# Patient Record
Sex: FEMALE | Race: BLACK OR AFRICAN AMERICAN | Employment: UNEMPLOYED | URBAN - METROPOLITAN AREA
[De-identification: names, ages, dates, MRNs, and addresses within clinical notes are randomized per-mention and may not be internally consistent; named-entity substitution may affect disease eponyms.]

---

## 2018-08-31 ENCOUNTER — OFFICE VISIT (OUTPATIENT)
Dept: FAMILY MEDICINE CLINIC | Age: 43
End: 2018-08-31

## 2018-08-31 VITALS
OXYGEN SATURATION: 95 % | BODY MASS INDEX: 20.09 KG/M2 | WEIGHT: 125 LBS | HEIGHT: 66 IN | HEART RATE: 97 BPM | RESPIRATION RATE: 18 BRPM | DIASTOLIC BLOOD PRESSURE: 82 MMHG | TEMPERATURE: 98.4 F | SYSTOLIC BLOOD PRESSURE: 130 MMHG

## 2018-08-31 DIAGNOSIS — Z72.0 TOBACCO USE: ICD-10-CM

## 2018-08-31 DIAGNOSIS — R63.4 WEIGHT LOSS: Primary | ICD-10-CM

## 2018-08-31 DIAGNOSIS — R39.15 URINARY URGENCY: ICD-10-CM

## 2018-08-31 DIAGNOSIS — Z00.00 ROUTINE ADULT HEALTH MAINTENANCE: ICD-10-CM

## 2018-08-31 DIAGNOSIS — F11.91 HISTORY OF HEROIN USE: ICD-10-CM

## 2018-08-31 DIAGNOSIS — J45.40 MODERATE PERSISTENT ASTHMATIC BRONCHITIS WITHOUT COMPLICATION: ICD-10-CM

## 2018-08-31 RX ORDER — CHLORPHENIRAMINE MALEATE 4 MG
TABLET ORAL
Refills: 1 | COMMUNITY
Start: 2018-07-11

## 2018-08-31 RX ORDER — FLUCONAZOLE 200 MG/1
TABLET ORAL
Refills: 0 | COMMUNITY
Start: 2018-08-28

## 2018-08-31 RX ORDER — NYSTATIN 100000 [USP'U]/ML
SUSPENSION ORAL
Refills: 1 | COMMUNITY
Start: 2018-07-11 | End: 2018-08-31

## 2018-08-31 RX ORDER — BUDESONIDE AND FORMOTEROL FUMARATE DIHYDRATE 160; 4.5 UG/1; UG/1
AEROSOL RESPIRATORY (INHALATION)
Refills: 2 | COMMUNITY
Start: 2018-07-11

## 2018-08-31 RX ORDER — OMEPRAZOLE 20 MG/1
CAPSULE, DELAYED RELEASE ORAL
Refills: 2 | COMMUNITY
Start: 2018-08-28

## 2018-08-31 NOTE — PROGRESS NOTES
Chief Complaint   Patient presents with    Complete Physical     New Patient .  has concern of weight loss and appetite     Cough     cough with some mucus production , more mucus production in the early morning and then dry throughout the day. has seen an ENT and Pulmonologist

## 2018-08-31 NOTE — PATIENT INSTRUCTIONS
Stopping Smoking: Care Instructions  Your Care Instructions  Cigarette smokers crave the nicotine in cigarettes. Giving it up is much harder than simply changing a habit. Your body has to stop craving the nicotine. It is hard to quit, but you can do it. There are many tools that people use to quit smoking. You may find that combining tools works best for you. There are several steps to quitting. First you get ready to quit. Then you get support to help you. After that, you learn new skills and behaviors to become a nonsmoker. For many people, a necessary step is getting and using medicine. Your doctor will help you set up the plan that best meets your needs. You may want to attend a smoking cessation program to help you quit smoking. When you choose a program, look for one that has proven success. Ask your doctor for ideas. You will greatly increase your chances of success if you take medicine as well as get counseling or join a cessation program.  Some of the changes you feel when you first quit tobacco are uncomfortable. Your body will miss the nicotine at first, and you may feel short-tempered and grumpy. You may have trouble sleeping or concentrating. Medicine can help you deal with these symptoms. You may struggle with changing your smoking habits and rituals. The last step is the tricky one: Be prepared for the smoking urge to continue for a time. This is a lot to deal with, but keep at it. You will feel better. Follow-up care is a key part of your treatment and safety. Be sure to make and go to all appointments, and call your doctor if you are having problems. It's also a good idea to know your test results and keep a list of the medicines you take. How can you care for yourself at home? · Ask your family, friends, and coworkers for support. You have a better chance of quitting if you have help and support.   · Join a support group, such as Nicotine Anonymous, for people who are trying to quit smoking. · Consider signing up for a smoking cessation program, such as the American Lung Association's Freedom from Smoking program.  · Get text messaging support. Go to the website at www.smokefree. gov to sign up for the [facility] program.  · Set a quit date. Pick your date carefully so that it is not right in the middle of a big deadline or stressful time. Once you quit, do not even take a puff. Get rid of all ashtrays and lighters after your last cigarette. Clean your house and your clothes so that they do not smell of smoke. · Learn how to be a nonsmoker. Think about ways you can avoid those things that make you reach for a cigarette. ¨ Avoid situations that put you at greatest risk for smoking. For some people, it is hard to have a drink with friends without smoking. For others, they might skip a coffee break with coworkers who smoke. ¨ Change your daily routine. Take a different route to work or eat a meal in a different place. · Cut down on stress. Calm yourself or release tension by doing an activity you enjoy, such as reading a book, taking a hot bath, or gardening. · Talk to your doctor or pharmacist about nicotine replacement therapy, which replaces the nicotine in your body. You still get nicotine but you do not use tobacco. Nicotine replacement products help you slowly reduce the amount of nicotine you need. These products come in several forms, many of them available over-the-counter:  ¨ Nicotine patches  ¨ Nicotine gum and lozenges  ¨ Nicotine inhaler  · Ask your doctor about bupropion (Wellbutrin) or varenicline (Chantix), which are prescription medicines. They do not contain nicotine. They help you by reducing withdrawal symptoms, such as stress and anxiety. · Some people find hypnosis, acupuncture, and massage helpful for ending the smoking habit. · Eat a healthy diet and get regular exercise. Having healthy habits will help your body move past its craving for nicotine.   · Be prepared to keep trying. Most people are not successful the first few times they try to quit. Do not get mad at yourself if you smoke again. Make a list of things you learned and think about when you want to try again, such as next week, next month, or next year. Where can you learn more? Go to http://keyla-noel.info/. Enter N245 in the search box to learn more about \"Stopping Smoking: Care Instructions. \"  Current as of: November 29, 2017  Content Version: 11.7  © 0791-5578 [employer]. Care instructions adapted under license by [employer] (which disclaims liability or warranty for this information). If you have questions about a medical condition or this instruction, always ask your healthcare professional. [address] any warranty or liability for your use of this information. Abnormal Weight Loss: Care Instructions  Your Care Instructions    There are two types of weight loss-normal and abnormal. The normal kind happens when you are trying to lose weight by exercising more or eating less. The abnormal kind happens when you are not trying to lose weight. Many medical problems can cause abnormal weight loss. These include problems with your thyroid gland, long-term infections, mouth or throat problems that make it hard to eat, and digestive problems. They also include depression and cancer. Some medicines also may cause you to lose weight. You can work with your doctor to find the cause of your weight loss. You will probably need tests to do this. Follow-up care is a key part of your treatment and safety. Be sure to make and go to all appointments, and call your doctor if you are having problems. It's also a good idea to know your test results and keep a list of the medicines you take. How can you care for yourself at home? · Weigh yourself at the same time every day. It's best to do it first thing in the morning after you empty your bladder. Be sure to always wear the same amount of clothing. · Write down any changes in your weight and the possible causes. Discuss these with your doctor. · Your doctor may want you to change your diet. Do your best to follow his or her advice. · Ask your doctor if you should see a dietitian. This is a person who can help you plan meals that work best for your lifestyle. · Note any changes in bowel habits. These may include changes in how often you have a bowel movement. Other changes include the color and size of your stools and how solid they are. · If you are prescribed medicines, take them exactly as prescribed. Call your doctor if you think you are having a problem with your medicine. You will get more details on the specific medicines your doctor prescribes. When should you call for help? Watch closely for changes in your health, and be sure to contact your doctor if:    · You do not get better as expected.     · You continue to lose weight. Where can you learn more? Go to http://keyla-noel.info/. Enter W012 in the search box to learn more about \"Abnormal Weight Loss: Care Instructions. \"  Current as of: October 9, 2017  Content Version: 11.7  © 3223-6583 [employer]. Care instructions adapted under license by [employer] (which disclaims liability or warranty for this information). If you have questions about a medical condition or this instruction, always ask your healthcare professional. [address] any warranty or liability for your use of this information.

## 2018-08-31 NOTE — MR AVS SNAPSHOT
[address] 
[phone] Patient: Wilfred Raygoza MRN: [id number] :[id number] Visit Information Date & Time Provider Department Dept. Phone Encounter #  
 2018  9:30 AM Daniela Olivares  [facility] [phone] [id number] Follow-up Instructions Return in about 2 weeks (around 2018) for pap smear. Upcoming Health Maintenance Date Due Pneumococcal 19-64 Medium Risk (1 of 1 - PPSV23) 1993 DTaP/Tdap/Td series (1 - Tdap) 1995 PAP AKA CERVICAL CYTOLOGY 1995 Influenza Age 5 to Adult 3/31/2019* *Topic was postponed. The date shown is not the original due date. Allergies as of 2018  Review Complete On: 2018 By: Janet Peres LPN No Known Allergies Current Immunizations  Never Reviewed No immunizations on file. Not reviewed this visit You Were Diagnosed With   
  
 Codes Comments Weight loss    -  Primary ICD-10-CM: R63.4 ICD-9-CM: 783.21 Moderate persistent asthmatic bronchitis without complication     OSZ-01-BA: J45.40 ICD-9-CM: 493.90 Routine adult health maintenance     ICD-10-CM: Z00.00 ICD-9-CM: V70.0 Encounter for screening for lipid disorder     ICD-10-CM: Z13.220 ICD-9-CM: V77.91 History of heroin use     ICD-10-CM: Z86.59 
ICD-9-CM: V11.8 Tobacco use     ICD-10-CM: Z72.0 ICD-9-CM: 305.1 Urinary urgency     ICD-10-CM: R39.15 ICD-9-CM: 418.13 Vitals BP Pulse Temp Resp Height(growth percentile) Weight(growth percentile) 130/82 (BP 1 Location: Left arm, BP Patient Position: Sitting) 97 98.4 °F (36.9 °C) (Oral) 18 5' 6\" (1.676 m) 125 lb (56.7 kg) LMP SpO2 BMI OB Status Smoking Status 2018 95% 20.18 kg/m2 Having regular periods Current Every Day Smoker Vitals History BMI and BSA Data Body Mass Index Body Surface Area 20.18 kg/m 2 1.62 m 2 Your Updated Medication List  
  
   
This list is accurate as of 8/31/18 10:07 AM.  Always use your most recent med list.  
  
  
  
  
 clotrimazole 1 % topical cream  
Commonly known as:  LOTRIMIN  
APPLY TO AFFECTED AREA TWICE A DAY  
  
 fluconazole 200 mg tablet Commonly known as:  DIFLUCAN  
TAKE 2 TABLETS BY MOUTH EVERY DAY  
  
 omeprazole 20 mg capsule Commonly known as:  PRILOSEC  
TAKE ONE CAPSULE BY MOUTH TWICE A DAY 30 MINUTES PRIOR TO A MEAL  
  
 SYMBICORT 160-4.5 mcg/actuation [facility] Generic drug:  budesonide-formoterol INHALE 2 PUFFS BY MOUTH TWICE A DAY We Performed the Following CBC WITH AUTOMATED DIFF [81704 CPT(R)] CHRONIC HEPATITIS PANEL [[id number] Custom] HIV 1/2 AG/AB, 4TH GENERATION,W RFLX CONFIRM [id number] CPT(R)] METABOLIC PANEL, COMPREHENSIVE [52406 CPT(R)] OCCULT BLOOD IMMUNOASSAY,DIAGNOSTIC [44871 CPT(R)] TSH REFLEX TO T4 [53756 CPT(R)] UA/M W/RFLX CULTURE, ROUTINE [[id number] Custom] Follow-up Instructions Return in about 2 weeks (around 9/14/2018) for pap smear. Patient Instructions Stopping Smoking: Care Instructions Your Care Instructions Cigarette smokers crave the nicotine in cigarettes. Giving it up is much harder than simply changing a habit. Your body has to stop craving the nicotine. It is hard to quit, but you can do it. There are many tools that people use to quit smoking. You may find that combining tools works best for you. There are several steps to quitting. First you get ready to quit. Then you get support to help you. After that, you learn new skills and behaviors to become a nonsmoker. For many people, a necessary step is getting and using medicine. Your doctor will help you set up the plan that best meets your needs. You may want to attend a smoking cessation program to help you quit smoking. When you choose a program, look for one that has proven success.  Ask your doctor for ideas. You will greatly increase your chances of success if you take medicine as well as get counseling or join a cessation program. 
Some of the changes you feel when you first quit tobacco are uncomfortable. Your body will miss the nicotine at first, and you may feel short-tempered and grumpy. You may have trouble sleeping or concentrating. Medicine can help you deal with these symptoms. You may struggle with changing your smoking habits and rituals. The last step is the tricky one: Be prepared for the smoking urge to continue for a time. This is a lot to deal with, but keep at it. You will feel better. Follow-up care is a key part of your treatment and safety. Be sure to make and go to all appointments, and call your doctor if you are having problems. It's also a good idea to know your test results and keep a list of the medicines you take. How can you care for yourself at home? · Ask your family, friends, and coworkers for support. You have a better chance of quitting if you have help and support. · Join a support group, such as Nicotine Anonymous, for people who are trying to quit smoking. · Consider signing up for a smoking cessation program, such as the American Lung Association's Freedom from Smoking program. 
· Get text messaging support. Go to the website at www.smokefree. gov to sign up for the [facility] program. 
· Set a quit date. Pick your date carefully so that it is not right in the middle of a big deadline or stressful time. Once you quit, do not even take a puff. Get rid of all ashtrays and lighters after your last cigarette. Clean your house and your clothes so that they do not smell of smoke. · Learn how to be a nonsmoker. Think about ways you can avoid those things that make you reach for a cigarette. ¨ Avoid situations that put you at greatest risk for smoking. For some people, it is hard to have a drink with friends without smoking.  For others, they might skip a coffee break with coworkers who smoke. ¨ Change your daily routine. Take a different route to work or eat a meal in a different place. · Cut down on stress. Calm yourself or release tension by doing an activity you enjoy, such as reading a book, taking a hot bath, or gardening. · Talk to your doctor or pharmacist about nicotine replacement therapy, which replaces the nicotine in your body. You still get nicotine but you do not use tobacco. Nicotine replacement products help you slowly reduce the amount of nicotine you need. These products come in several forms, many of them available over-the-counter: ¨ Nicotine patches ¨ Nicotine gum and lozenges ¨ Nicotine inhaler · Ask your doctor about bupropion (Wellbutrin) or varenicline (Chantix), which are prescription medicines. They do not contain nicotine. They help you by reducing withdrawal symptoms, such as stress and anxiety. · Some people find hypnosis, acupuncture, and massage helpful for ending the smoking habit. · Eat a healthy diet and get regular exercise. Having healthy habits will help your body move past its craving for nicotine. · Be prepared to keep trying. Most people are not successful the first few times they try to quit. Do not get mad at yourself if you smoke again. Make a list of things you learned and think about when you want to try again, such as next week, next month, or next year. Where can you learn more? Go to http://keyla-noel.info/. Enter Q409 in the search box to learn more about \"Stopping Smoking: Care Instructions. \" Current as of: November 29, 2017 Content Version: 11.7 © 0505-0142 [employer]. Care instructions adapted under license by Optiant (which disclaims liability or warranty for this information).  If you have questions about a medical condition or this instruction, always ask your healthcare professional. [employer] disclaims any warranty or liability for your use of this information. Abnormal Weight Loss: Care Instructions Your Care Instructions There are two types of weight loss-normal and abnormal. The normal kind happens when you are trying to lose weight by exercising more or eating less. The abnormal kind happens when you are not trying to lose weight. Many medical problems can cause abnormal weight loss. These include problems with your thyroid gland, long-term infections, mouth or throat problems that make it hard to eat, and digestive problems. They also include depression and cancer. Some medicines also may cause you to lose weight. You can work with your doctor to find the cause of your weight loss. You will probably need tests to do this. Follow-up care is a key part of your treatment and safety. Be sure to make and go to all appointments, and call your doctor if you are having problems. It's also a good idea to know your test results and keep a list of the medicines you take. How can you care for yourself at home? · Weigh yourself at the same time every day. It's best to do it first thing in the morning after you empty your bladder. Be sure to always wear the same amount of clothing. · Write down any changes in your weight and the possible causes. Discuss these with your doctor. · Your doctor may want you to change your diet. Do your best to follow his or her advice. · Ask your doctor if you should see a dietitian. This is a person who can help you plan meals that work best for your lifestyle. · Note any changes in bowel habits. These may include changes in how often you have a bowel movement. Other changes include the color and size of your stools and how solid they are. · If you are prescribed medicines, take them exactly as prescribed. Call your doctor if you think you are having a problem with your medicine.  You will get more details on the specific medicines your doctor prescribes. When should you call for help? Watch closely for changes in your health, and be sure to contact your doctor if: 
  · You do not get better as expected.  
  · You continue to lose weight. Where can you learn more? Go to http://keyla-noel.info/. Enter A790 in the search box to learn more about \"Abnormal Weight Loss: Care Instructions. \" Current as of: October 9, 2017 Content Version: 11.7 © 8139-9747 [employer]ently. Care instructions adapted under license by Vaultive (which disclaims liability or warranty for this information). If you have questions about a medical condition or this instruction, always ask your healthcare professional. [address] any warranty or liability for your use of this information. Introducing [facility]! [employer] introduces [employer] patient portal. Now you can access parts of your medical record, email your doctor's office, and request medication refills online. 1. In your internet browser, go to [email or web address] 2. Click on the First Time User? Click Here link in the Sign In box. You will see the New Member Sign Up page. 3. Enter your [employer] Access Code exactly as it appears below. You will not need to use this code after youve completed the sign-up process. If you do not sign up before the expiration date, you must request a new code. · [employer] Access Code: [email or web address] Expires: 11/29/2018 10:07 AM 
 
4. Enter the last four digits of your Social Security Number (xxxx) and Date of Birth (mm/dd/yyyy) as indicated and click Submit. You will be taken to the next sign-up page. 5. Create a [employer] ID. This will be your [employer] login ID and cannot be changed, so think of one that is secure and easy to remember. 6. Create a [employer] password. You can change your password at any time. 7. Enter your Password Reset Question and Answer. This can be used at a later time if you forget your password. 8. Enter your e-mail address. You will receive e-mail notification when new information is available in [address] 9. Click Sign Up. You can now view and download portions of your medical record. 10. Click the Download Summary menu link to download a portable copy of your medical information. If you have questions, please visit the Frequently Asked Questions section of the In[employer] website. Remember, [employer] is NOT to be used for urgent needs. For medical emergencies, dial 911. Now available from your iPhone and Android! Please provide this summary of care documentation to your next provider. Your primary care clinician is listed as PROVIDER UNKNOWN. If you have any questions after today's visit, please call [phone].

## 2018-09-04 LAB
ALBUMIN SERPL-MCNC: 3.5 G/DL (ref 3.5–5.5)
ALBUMIN/GLOB SERPL: 0.9 {RATIO} (ref 1.2–2.2)
ALP SERPL-CCNC: 76 IU/L (ref 39–117)
ALT SERPL-CCNC: 7 IU/L (ref 0–32)
APPEARANCE UR: CLEAR
AST SERPL-CCNC: 18 IU/L (ref 0–40)
BACTERIA #/AREA URNS HPF: NORMAL /[HPF]
BACTERIA UR CULT: ABNORMAL
BASOPHILS # BLD AUTO: 0 X10E3/UL (ref 0–0.2)
BASOPHILS NFR BLD AUTO: 0 %
BILIRUB SERPL-MCNC: <0.2 MG/DL (ref 0–1.2)
BILIRUB UR QL STRIP: NEGATIVE
BUN SERPL-MCNC: 7 MG/DL (ref 6–24)
BUN/CREAT SERPL: 8 (ref 9–23)
CALCIUM SERPL-MCNC: 9.1 MG/DL (ref 8.7–10.2)
CASTS URNS QL MICRO: NORMAL /LPF
CHLORIDE SERPL-SCNC: 102 MMOL/L (ref 96–106)
CO2 SERPL-SCNC: 24 MMOL/L (ref 20–29)
COLOR UR: YELLOW
COMMENT, 144067: NORMAL
CREAT SERPL-MCNC: 0.9 MG/DL (ref 0.57–1)
EOSINOPHIL # BLD AUTO: 0.1 X10E3/UL (ref 0–0.4)
EOSINOPHIL NFR BLD AUTO: 2 %
EPI CELLS #/AREA URNS HPF: NORMAL /HPF
ERYTHROCYTE [DISTWIDTH] IN BLOOD BY AUTOMATED COUNT: 15.1 % (ref 12.3–15.4)
GLOBULIN SER CALC-MCNC: 3.8 G/DL (ref 1.5–4.5)
GLUCOSE SERPL-MCNC: 82 MG/DL (ref 65–99)
GLUCOSE UR QL: NEGATIVE
HBV CORE AB SERPL QL IA: NEGATIVE
HBV CORE IGM SERPL QL IA: NEGATIVE
HBV E AB SERPL QL IA: NEGATIVE
HBV E AG SERPL QL IA: NEGATIVE
HBV SURFACE AB SER QL: REACTIVE
HBV SURFACE AG SERPL QL IA: NEGATIVE
HCT VFR BLD AUTO: 29.9 % (ref 34–46.6)
HCV AB S/CO SERPL IA: <0.1 S/CO RATIO (ref 0–0.9)
HGB BLD-MCNC: 9.7 G/DL (ref 11.1–15.9)
HGB UR QL STRIP: NEGATIVE
HIV 1+2 AB+HIV1 P24 AG SERPL QL IA: REACTIVE
HIV 2 AB SERPL QL IA: NEGATIVE
HIV1 AB SERPLBLD QL IA.RAPID: POSITIVE
IMM GRANULOCYTES # BLD: 0 X10E3/UL (ref 0–0.1)
IMM GRANULOCYTES NFR BLD: 1 %
INTERPRETATION: ABNORMAL
KETONES UR QL STRIP: NEGATIVE
LEUKOCYTE ESTERASE UR QL STRIP: ABNORMAL
LYMPHOCYTES # BLD AUTO: 0.8 X10E3/UL (ref 0.7–3.1)
LYMPHOCYTES NFR BLD AUTO: 16 %
MCH RBC QN AUTO: 29.4 PG (ref 26.6–33)
MCHC RBC AUTO-ENTMCNC: 32.4 G/DL (ref 31.5–35.7)
MCV RBC AUTO: 91 FL (ref 79–97)
MICRO URNS: ABNORMAL
MONOCYTES # BLD AUTO: 0.2 X10E3/UL (ref 0.1–0.9)
MONOCYTES NFR BLD AUTO: 5 %
MUCOUS THREADS URNS QL MICRO: PRESENT
NEUTROPHILS # BLD AUTO: 3.5 X10E3/UL (ref 1.4–7)
NEUTROPHILS NFR BLD AUTO: 76 %
NITRITE UR QL STRIP: POSITIVE
PH UR STRIP: 6 [PH] (ref 5–7.5)
PLATELET # BLD AUTO: 340 X10E3/UL (ref 150–379)
POTASSIUM SERPL-SCNC: 3.6 MMOL/L (ref 3.5–5.2)
PROT SERPL-MCNC: 7.3 G/DL (ref 6–8.5)
PROT UR QL STRIP: ABNORMAL
RBC # BLD AUTO: 3.3 X10E6/UL (ref 3.77–5.28)
RBC #/AREA URNS HPF: NORMAL /HPF
SODIUM SERPL-SCNC: 141 MMOL/L (ref 134–144)
SP GR UR: 1.01 (ref 1–1.03)
TSH SERPL DL<=0.005 MIU/L-ACNC: 2.96 UIU/ML (ref 0.45–4.5)
URINALYSIS REFLEX, 377202: ABNORMAL
UROBILINOGEN UR STRIP-MCNC: 1 MG/DL (ref 0.2–1)
WBC # BLD AUTO: 4.6 X10E3/UL (ref 3.4–10.8)
WBC #/AREA URNS HPF: NORMAL /HPF

## 2018-09-05 ENCOUNTER — TELEPHONE (OUTPATIENT)
Dept: FAMILY MEDICINE CLINIC | Age: 43
End: 2018-09-05

## 2018-09-05 DIAGNOSIS — N30.00 ACUTE CYSTITIS WITHOUT HEMATURIA: Primary | ICD-10-CM

## 2018-09-05 RX ORDER — NITROFURANTOIN 25; 75 MG/1; MG/1
100 CAPSULE ORAL 2 TIMES DAILY
Qty: 10 CAP | Refills: 0 | Status: CANCELLED | OUTPATIENT
Start: 2018-09-05 | End: 2018-09-10

## 2018-09-05 NOTE — TELEPHONE ENCOUNTER
Called patient to discussed lab results. She did not answer and a voicemail message was left to return call.

## 2018-09-06 NOTE — TELEPHONE ENCOUNTER
Attempted to call patient to discuss lab results. Voicemail was received and message was left to return phone call.

## 2018-09-07 NOTE — PROGRESS NOTES
Please call patient. Have her schedule appointment ASAP to review her lab results. Please confirm pharmacy so that we can send prescription for UTI that is pending. Thank you.

## 2018-09-07 NOTE — PROGRESS NOTES
[phone] attempted to call patient no answer left message to call me back in regards to her lab results

## 2018-09-10 NOTE — PROGRESS NOTES
I have been unable to contact the patient multiple times by her provided phone number at [phone]. Franklyn Quevedo, please print off a letter with all results and advise to contact our office ASAP for treatment of UTI and to discuss referral to Infectious Disease specialist for treatment of HIV - Will we have to send a certified letter. Thank you.

## 2018-09-10 NOTE — TELEPHONE ENCOUNTER
Attempted to call patient multiple times, each time I have received voicemail. Message left to call back to discuss results. Will proceed with certified letter at this time.

## 2018-09-11 NOTE — TELEPHONE ENCOUNTER
Certified letter with results of recent lab work, including positive HIV status was mailed to patient today 9/11/2018, after multiple attempts to call the patient without response. HIV status was reported to Health department per guidelines, see scanned report)

## 2018-09-11 NOTE — PROGRESS NOTES
[phone] attempted to call patient no answer left message again that its important for her to call us back in regards to her lab results  Per Kimmy Brantley we are going to send certified letter for patients labs

## 2018-09-13 ENCOUNTER — TELEPHONE (OUTPATIENT)
Dept: FAMILY MEDICINE CLINIC | Age: 43
End: 2018-09-13

## 2018-09-13 NOTE — TELEPHONE ENCOUNTER
Rocio transfer have Ed from [facility] Department calling needing to speak to you about patients Juan Pabloluzma Forde [birth year] lab results    Spoke to [facility] Department per Ed they have received lab results patient in regards to HIV being positive he wanted to find out if patient is aware of her results. I advised Ed we have tried to contact patient multiple times, patient had follow up today and no show, I did advised him that we did send certified letter.  Per Ed he cant talk to patient till she is aware of the results but he will call me back again next week to see if we were able to contact patient if we can't then he will be able to send someone to her residence to discuss the situation

## 2018-10-02 NOTE — PROGRESS NOTES
Unable to reach patient. Certified letter sent regarding the need for treatment for UTI and new diagnosis of HIV. Will close encounter at this time.

## 2019-07-24 NOTE — PROGRESS NOTES
[facility] Note  HPI:   Natalie Suggs is a 36 y.o. female who presents to [facility] care with the following chief complaints. Previous provider: None    Chief Complaint   Patient presents with    Complete Physical     New Patient .  has concern of weight loss and appetite     Cough     cough with some mucus production , more mucus production in the early morning and then dry throughout the day. has seen an ENT and Pulmonologist      Chronic Cough  Developed cough 1/2018. At first did not think much of it. Cough persisted and she was evaluated by an ENT in 2/2018. Given nasal steroid spray without relief and was given nystatin mouth wash for thrush. They discussed allergy testing but she did not have then this done. The cough worsened so she scheduled herself to see a pulmonology. In the interim she presented to [facility] with normal chest x-ray. Was finally evaluated by pulmonologist, Dr. Palomo Dotson, in 7/2018. He diagnosed her with asthmatic bronchitis. She states chest ct showed pustules at end of bronchioles which he thought would improve. She was started on daily Symbicort and omeprazole for reflux symptoms, and was given oral diflucan for thrush that had not improved. He ordered blood test to be drawn but she has not had them drawn yet. She was waiting to be seen here today in case labs could be drawn at the same time. Weight loss  She has had 30 pound weight loss since 2/2018; Previously around 155 lbs, 125 lbs today. She had a decreased appetite. Appetite has improved greatly since starting PPI last week. She feels her weight has been stable over the past few weeks without additional weight loss. She drinks Ensures about 1 a day for nutritional support. History of heroin use from 2005- 2007. Denies current drug use. Current everyday smoker since age 11. Has cut back from 1 pack to .25 pack per day with cough. She is thinking about cutting back further.  Not 1       Allergies: Allergies   Allergen Reactions    Amoxicillin     Clindamycin/Lincomycin     Focalin [Dexmethylphenidate Hcl]      headaches    Pertussis Vaccines        Problem List:    Patient Active Problem List   Diagnosis Code    Decreased hearing H91.90    Interstitial cystitis N30.10    Attention deficit hyperactivity disorder (ADHD), predominantly inattentive type F90.0    Otitic barotrauma T70. 0XXA    Right otitis media with effusion H65.91    Sensorineural hearing loss (SNHL) of left ear with unrestricted hearing of right ear H90.42    Tinnitus of both ears H93.13    Ear pressure, bilateral H93.8X3       Past Medical History:        Diagnosis Date    Attention deficit hyperactivity disorder (ADHD), predominantly inattentive type     as a teenager    Decreased hearing     Left    Hearing loss     Interstitial cystitis     TMJ dysfunction        Past Surgical History:        Procedure Laterality Date    APPENDECTOMY  6/1/11    [facility]: [facility]    TYMPANOSTOMY TUBE PLACEMENT  12/89    x 3     UPPER GASTROINTESTINAL ENDOSCOPY  10/96       Social History:    Social History     Tobacco Use    Smoking status: Never Smoker    Smokeless tobacco: Never Used   Substance Use Topics    Alcohol use: Yes     Alcohol/week: 7.0 standard drinks     Types: 7 Glasses of wine per week                                Counseling given: Not Answered      Vital Signs (Current):   Vitals:    07/24/19 1106   Weight: 130 lb (59 kg)   Height: 5' 7.5\" (1.715 m)                                              BP Readings from Last 3 Encounters:   07/23/19 108/68   07/01/19 107/62   06/25/19 104/68       NPO Status:                                                                                 BMI:   Wt Readings from Last 3 Encounters:   07/23/19 134 lb (60.8 kg)   07/01/19 133 lb 12.8 oz (60.7 kg)   06/25/19 132 lb 3.2 oz (60 kg)     Body mass index is 20.06 kg/m².     CBC:   Lab Results   Component Value Date    WBC interested in medication therapy or nicotine replacement. She feels she can quit by herself. Health Maintenance Reviewed   She is over due for pap smear. Denies history of abnormal pap smears. She has not had mammogram  She has had Pneumococcal vaccine. Over due for Tdap. Health Maintenance   Topic Date Due    Pneumococcal 19-64 Medium Risk (1 of 1 - PPSV23) 11/08/1993    DTaP/Tdap/Td series (1 - Tdap) 11/08/1995    PAP AKA CERVICAL CYTOLOGY  11/08/1995    Influenza Age 9 to Adult  03/31/2019 (Originally 8/1/2018)       Current Outpatient Prescriptions   Medication Sig Dispense Refill    omeprazole (PRILOSEC) 20 mg capsule TAKE ONE CAPSULE BY MOUTH TWICE A DAY 30 MINUTES PRIOR TO A MEAL  2    fluconazole (DIFLUCAN) 200 mg tablet TAKE 2 TABLETS BY MOUTH EVERY DAY  0    clotrimazole (LOTRIMIN) 1 % topical cream APPLY TO AFFECTED AREA TWICE A DAY  1    SYMBICORT 160-4.5 mcg/actuation HFAA INHALE 2 PUFFS BY MOUTH TWICE A DAY  2      No Known Allergies   There is no problem list on file for this patient. Past Medical History:   Diagnosis Date    Chronic cough     GERD (gastroesophageal reflux disease)     Thrush, oral     Tinea corporis     UTI (urinary tract infection)     hospitalized as child      Past Surgical History:   Procedure Laterality Date    HX TUBAL LIGATION        Patient's last menstrual period was 08/11/2018.    Family History   Problem Relation Age of Onset    Kidney Disease Mother     No Known Problems Father     Kidney Disease Maternal Grandfather     No Known Problems Sister     No Known Problems Brother     No Known Problems Maternal Grandmother     No Known Problems Paternal Grandmother     No Known Problems Paternal Grandfather     No Known Problems Sister     Hearing Impairment Daughter     Learning disabilities Son     Colon Cancer Neg Hx     Breast Cancer Neg Hx     Uterine Cancer Neg Hx     Ovarian Cancer Neg Hx     Thyroid Disease Neg Hx     Diabetes Neg Hx       Social History     Social History    Marital status:      Spouse name: N/A    Number of children: N/A    Years of education: N/A     Occupational History    Not on file. Social History Main Topics    Smoking status: Current Every Day Smoker     Packs/day: 0.25     Types: Cigarettes    Smokeless tobacco: Never Used      Comment: 5 cigarettes a day, started at 11    Alcohol use Yes      Comment: previously nightly 1 drinks. Has stoped since 7/2018    Drug use: No    Sexual activity: Yes     Other Topics Concern    Not on file     Social History Narrative    Lives at home with     Works at supervisor at call center             ROS:   Review of Systems   Constitutional: Positive for diaphoresis and weight loss. Negative for chills, fever and malaise/fatigue. HENT: Negative for congestion, ear pain, hearing loss, sinus pain, sore throat and tinnitus. Eyes: Negative for blurred vision and double vision. Respiratory: Positive for cough, sputum production and wheezing. Negative for hemoptysis and shortness of breath. Feels winded easily. Cardiovascular: Negative for chest pain, palpitations, claudication and leg swelling. Gastrointestinal: Positive for heartburn. Negative for abdominal pain, blood in stool, constipation, diarrhea, melena, nausea and vomiting. Genitourinary: Negative for dysuria and hematuria. Musculoskeletal: Negative for joint pain and myalgias. Skin: Negative for itching and rash. Left ankle: rash was treated with antifungal cream by pulmonologist with improvement of symptoms. Neurological: Negative for dizziness, tingling, sensory change, focal weakness, seizures, loss of consciousness, weakness and headaches. Endo/Heme/Allergies: Negative for environmental allergies and polydipsia. Does not bruise/bleed easily. Psychiatric/Behavioral: Negative for depression.  The patient is not nervous/anxious and does not have insomnia. Physical Exam:     Visit Vitals    /82 (BP 1 Location: Left arm, BP Patient Position: Sitting)    Pulse 97    Temp 98.4 °F (36.9 °C) (Oral)    Resp 18    Ht 5' 6\" (1.676 m)    Wt 125 lb (56.7 kg)    LMP 08/11/2018    SpO2 95%    BMI 20.18 kg/m2        Vitals and Nurse Documentation reviewed. Physical Exam   Constitutional: She is oriented to person, place, and time and well-developed, well-nourished, and in no distress. Vital signs are normal.   HENT:   Head: Normocephalic and atraumatic. Right Ear: Hearing, tympanic membrane, external ear and ear canal normal. Tympanic membrane is not erythematous. No middle ear effusion. Left Ear: Hearing, external ear and ear canal normal. Tympanic membrane is not erythematous. No middle ear effusion. Nose: Nose normal. No nasal deformity or septal deviation. Mouth/Throat: Uvula is midline and oropharynx is clear and moist. Mucous membranes are not pale, not dry and not cyanotic. Normal dentition. No dental caries. No oropharyngeal exudate. Eyes: Conjunctivae and lids are normal. Pupils are equal, round, and reactive to light. Right conjunctiva is not injected. Left conjunctiva is not injected. Neck: Normal range of motion and phonation normal. Neck supple. No tracheal deviation present. No thyroid mass and no thyromegaly present. Cardiovascular: Normal rate, regular rhythm, S1 normal, S2 normal, normal heart sounds and intact distal pulses. Exam reveals no gallop and no friction rub. No murmur heard. Pulses:       Radial pulses are 2+ on the right side, and 2+ on the left side. Dorsalis pedis pulses are 2+ on the right side, and 2+ on the left side. Pulmonary/Chest: Effort normal. No accessory muscle usage. No respiratory distress. She has decreased breath sounds in the right middle field and the left middle field. She has no wheezes. She has no rhonchi. She has no rales. She exhibits no tenderness. Abdominal: Soft. Normal appearance and bowel sounds are normal. She exhibits no distension, no abdominal bruit and no mass. There is no hepatosplenomegaly. There is no tenderness. There is no rebound and no guarding. Musculoskeletal: Normal range of motion. She exhibits no edema, tenderness or deformity. Lymphadenopathy:        Head (right side): No submental, no submandibular, no tonsillar, no preauricular, no posterior auricular and no occipital adenopathy present. Head (left side): No submental, no submandibular, no tonsillar, no preauricular, no posterior auricular and no occipital adenopathy present. She has no cervical adenopathy. Right: No supraclavicular adenopathy present. Left: No supraclavicular adenopathy present. Neurological: She is alert and oriented to person, place, and time. She has normal sensation, normal strength, normal reflexes and intact cranial nerves. Gait normal.   Skin: Skin is warm, dry and intact. No rash noted. She is not diaphoretic. No cyanosis. Nails show no clubbing. Psychiatric: Mood and affect normal.   Nursing note and vitals reviewed. Assessment/ Plan:   [employer] were discussed including hours of operation, on-call providers, and MyChart. Diagnoses and all orders for this visit:    1. Weight loss: 30 pounds weight loss over the past 9 months with associated decreased appetite. Some improvement in appetite since starting PPI therapy last week. Associated chronic cough. Current work-up for cough with pulmonology Dr. Elian Mirza, thought to be related to asthmatic bronchitis. Recent CT of chest exact results unclear- will obtain records. She is overdue for cancer screening. She will return for pap smear in the next 2 weeks will order mammogram after breast exam at this visit. Check urine for blood with tobacco abuse. Screen for HIV/hepatitis with history of heroin use. No worrisome signs for colon cancer, will check occult stool.  Consider Quantiferon gold when she returns. Continue ensures. Follow-up in 2 weeks. -     OCCULT BLOOD IMMUNOASSAY,DIAGNOSTIC  -     CBC WITH AUTOMATED DIFF  -     METABOLIC PANEL, COMPREHENSIVE  -     HIV 1/2 AG/AB, 4TH GENERATION,W RFLX CONFIRM  -     TSH REFLEX TO T4    2. Moderate persistent asthmatic bronchitis without complication: Chronic persistant cough. Current work-up with pulmonology Dr. Ozzy Ramirez, thought to be related to asthmatic bronchitis. Recent CT of chest exact results unclear- will obtain records. Discussed smoking cessation. Consider Quantiferon gold when she returns. Follow-up with pulm next month as scheduled. 3. Routine adult health maintenance: return within next 2 weeks for well women's exam.  Tdap and flu shot at this visit. She has already received pneumococcal vaccine. -     CBC WITH AUTOMATED DIFF  -     METABOLIC PANEL, COMPREHENSIVE  -     HIV 1/2 AG/AB, 4TH GENERATION,W RFLX CONFIRM    4. History of heroin use  -     HIV 1/2 AG/AB, 4TH GENERATION,W RFLX CONFIRM  -     CHRONIC HEPATITIS PANEL    5. Tobacco use: Contemplation stage. Resources provided. She declines Chantix or nicotine replacement. 6. Urinary urgency  -     UA/M W/RFLX CULTURE, ROUTINE      Follow-up Disposition:  Return in about 2 weeks (around 9/14/2018) for pap smear.      Discussed expected course/resolution/complications of diagnosis in detail with patient.    Medication risks/benefits/costs/interactions/alternatives discussed with patient.    Pt was given an after visit summary which includes diagnoses, current medications & vitals.  Pt expressed understanding with the diagnosis and plan